# Patient Record
Sex: FEMALE | Race: OTHER | HISPANIC OR LATINO | URBAN - METROPOLITAN AREA
[De-identification: names, ages, dates, MRNs, and addresses within clinical notes are randomized per-mention and may not be internally consistent; named-entity substitution may affect disease eponyms.]

---

## 2022-02-03 ENCOUNTER — HOSPITAL ENCOUNTER (EMERGENCY)
Facility: HOSPITAL | Age: 62
Discharge: HOME | End: 2022-02-03
Attending: EMERGENCY MEDICINE | Admitting: EMERGENCY MEDICINE
Payer: MEDICARE

## 2022-02-03 ENCOUNTER — APPOINTMENT (EMERGENCY)
Dept: ULTRASOUND IMAGING | Facility: HOSPITAL | Age: 62
End: 2022-02-03
Payer: MEDICARE

## 2022-02-03 ENCOUNTER — APPOINTMENT (EMERGENCY)
Dept: CT IMAGING | Facility: HOSPITAL | Age: 62
End: 2022-02-03
Payer: MEDICARE

## 2022-02-03 VITALS
RESPIRATION RATE: 20 BRPM | TEMPERATURE: 98.1 F | HEART RATE: 86 BPM | WEIGHT: 171.3 LBS | SYSTOLIC BLOOD PRESSURE: 172 MMHG | DIASTOLIC BLOOD PRESSURE: 90 MMHG | OXYGEN SATURATION: 100 %

## 2022-02-03 DIAGNOSIS — K80.20 GALLBLADDER COLIC: Primary | ICD-10-CM

## 2022-02-03 DIAGNOSIS — K80.20 GALLSTONES: ICD-10-CM

## 2022-02-03 DIAGNOSIS — K86.1 CHRONIC PANCREATITIS (HCC): ICD-10-CM

## 2022-02-03 LAB
ALBUMIN SERPL BCP-MCNC: 2.9 G/DL (ref 3.5–5)
ALP SERPL-CCNC: 176 U/L (ref 46–116)
ALT SERPL W P-5'-P-CCNC: 61 U/L (ref 12–78)
ANION GAP SERPL CALCULATED.3IONS-SCNC: 9 MMOL/L (ref 4–13)
AST SERPL W P-5'-P-CCNC: 174 U/L (ref 5–45)
ATRIAL RATE: 76 BPM
BASOPHILS # BLD AUTO: 0.02 THOUSANDS/ΜL (ref 0–0.1)
BASOPHILS NFR BLD AUTO: 0 % (ref 0–1)
BILIRUB SERPL-MCNC: 0.8 MG/DL (ref 0.2–1)
BUN SERPL-MCNC: 18 MG/DL (ref 5–25)
CALCIUM ALBUM COR SERPL-MCNC: 9.6 MG/DL (ref 8.3–10.1)
CALCIUM SERPL-MCNC: 8.7 MG/DL (ref 8.3–10.1)
CARDIAC TROPONIN I PNL SERPL HS: 5 NG/L
CHLORIDE SERPL-SCNC: 106 MMOL/L (ref 100–108)
CO2 SERPL-SCNC: 24 MMOL/L (ref 21–32)
CREAT SERPL-MCNC: 1.79 MG/DL (ref 0.6–1.3)
EOSINOPHIL # BLD AUTO: 0.02 THOUSAND/ΜL (ref 0–0.61)
EOSINOPHIL NFR BLD AUTO: 0 % (ref 0–6)
ERYTHROCYTE [DISTWIDTH] IN BLOOD BY AUTOMATED COUNT: 12.6 % (ref 11.6–15.1)
GFR SERPL CREATININE-BSD FRML MDRD: 30 ML/MIN/1.73SQ M
GLUCOSE SERPL-MCNC: 129 MG/DL (ref 65–140)
HCT VFR BLD AUTO: 39.3 % (ref 34.8–46.1)
HGB BLD-MCNC: 13.2 G/DL (ref 11.5–15.4)
IMM GRANULOCYTES # BLD AUTO: 0.01 THOUSAND/UL (ref 0–0.2)
IMM GRANULOCYTES NFR BLD AUTO: 0 % (ref 0–2)
LIPASE SERPL-CCNC: 233 U/L (ref 73–393)
LYMPHOCYTES # BLD AUTO: 1.55 THOUSANDS/ΜL (ref 0.6–4.47)
LYMPHOCYTES NFR BLD AUTO: 33 % (ref 14–44)
MCH RBC QN AUTO: 32.5 PG (ref 26.8–34.3)
MCHC RBC AUTO-ENTMCNC: 33.6 G/DL (ref 31.4–37.4)
MCV RBC AUTO: 97 FL (ref 82–98)
MONOCYTES # BLD AUTO: 0.41 THOUSAND/ΜL (ref 0.17–1.22)
MONOCYTES NFR BLD AUTO: 9 % (ref 4–12)
NEUTROPHILS # BLD AUTO: 2.74 THOUSANDS/ΜL (ref 1.85–7.62)
NEUTS SEG NFR BLD AUTO: 58 % (ref 43–75)
NRBC BLD AUTO-RTO: 0 /100 WBCS
P AXIS: 70 DEGREES
PLATELET # BLD AUTO: 236 THOUSANDS/UL (ref 149–390)
PMV BLD AUTO: 9.2 FL (ref 8.9–12.7)
POTASSIUM SERPL-SCNC: 3.3 MMOL/L (ref 3.5–5.3)
PR INTERVAL: 154 MS
PROT SERPL-MCNC: 7.5 G/DL (ref 6.4–8.2)
QRS AXIS: 63 DEGREES
QRSD INTERVAL: 86 MS
QT INTERVAL: 440 MS
QTC INTERVAL: 495 MS
RBC # BLD AUTO: 4.06 MILLION/UL (ref 3.81–5.12)
SODIUM SERPL-SCNC: 139 MMOL/L (ref 136–145)
T WAVE AXIS: 57 DEGREES
VENTRICULAR RATE: 76 BPM
WBC # BLD AUTO: 4.75 THOUSAND/UL (ref 4.31–10.16)

## 2022-02-03 PROCEDURE — 99285 EMERGENCY DEPT VISIT HI MDM: CPT | Performed by: EMERGENCY MEDICINE

## 2022-02-03 PROCEDURE — 80053 COMPREHEN METABOLIC PANEL: CPT | Performed by: EMERGENCY MEDICINE

## 2022-02-03 PROCEDURE — 76705 ECHO EXAM OF ABDOMEN: CPT

## 2022-02-03 PROCEDURE — 99285 EMERGENCY DEPT VISIT HI MDM: CPT

## 2022-02-03 PROCEDURE — 96375 TX/PRO/DX INJ NEW DRUG ADDON: CPT

## 2022-02-03 PROCEDURE — 36415 COLL VENOUS BLD VENIPUNCTURE: CPT | Performed by: EMERGENCY MEDICINE

## 2022-02-03 PROCEDURE — 85025 COMPLETE CBC W/AUTO DIFF WBC: CPT | Performed by: EMERGENCY MEDICINE

## 2022-02-03 PROCEDURE — 93010 ELECTROCARDIOGRAM REPORT: CPT | Performed by: INTERNAL MEDICINE

## 2022-02-03 PROCEDURE — 84484 ASSAY OF TROPONIN QUANT: CPT | Performed by: EMERGENCY MEDICINE

## 2022-02-03 PROCEDURE — 83690 ASSAY OF LIPASE: CPT | Performed by: EMERGENCY MEDICINE

## 2022-02-03 PROCEDURE — 93005 ELECTROCARDIOGRAM TRACING: CPT

## 2022-02-03 PROCEDURE — 74176 CT ABD & PELVIS W/O CONTRAST: CPT

## 2022-02-03 PROCEDURE — 96374 THER/PROPH/DIAG INJ IV PUSH: CPT

## 2022-02-03 RX ORDER — LINAGLIPTIN 5 MG/1
5 TABLET, FILM COATED ORAL DAILY
COMMUNITY
Start: 2021-10-11 | End: 2022-10-11

## 2022-02-03 RX ORDER — ATORVASTATIN CALCIUM 20 MG/1
20 TABLET, FILM COATED ORAL DAILY
COMMUNITY
Start: 2021-11-18 | End: 2022-11-18

## 2022-02-03 RX ORDER — ONDANSETRON 2 MG/ML
4 INJECTION INTRAMUSCULAR; INTRAVENOUS ONCE
Status: COMPLETED | OUTPATIENT
Start: 2022-02-03 | End: 2022-02-03

## 2022-02-03 RX ORDER — DOLUTEGRAVIR SODIUM 50 MG/1
50 TABLET, FILM COATED ORAL DAILY
COMMUNITY
Start: 2021-10-11

## 2022-02-03 RX ORDER — GLIMEPIRIDE 4 MG/1
1 TABLET ORAL 2 TIMES DAILY
COMMUNITY
Start: 2021-10-11

## 2022-02-03 RX ORDER — MORPHINE SULFATE 4 MG/ML
4 INJECTION, SOLUTION INTRAMUSCULAR; INTRAVENOUS ONCE
Status: COMPLETED | OUTPATIENT
Start: 2022-02-03 | End: 2022-02-03

## 2022-02-03 RX ORDER — KETOROLAC TROMETHAMINE 30 MG/ML
30 INJECTION, SOLUTION INTRAMUSCULAR; INTRAVENOUS ONCE
Status: COMPLETED | OUTPATIENT
Start: 2022-02-03 | End: 2022-02-03

## 2022-02-03 RX ORDER — EMTRICITABINE AND TENOFOVIR ALAFENAMIDE 200; 25 MG/1; MG/1
1 TABLET ORAL DAILY
COMMUNITY
Start: 2021-10-11

## 2022-02-03 RX ADMIN — MORPHINE SULFATE 4 MG: 4 INJECTION INTRAVENOUS at 18:06

## 2022-02-03 RX ADMIN — ONDANSETRON 4 MG: 2 INJECTION INTRAMUSCULAR; INTRAVENOUS at 18:05

## 2022-02-03 RX ADMIN — KETOROLAC TROMETHAMINE 30 MG: 30 INJECTION, SOLUTION INTRAMUSCULAR at 18:05

## 2022-02-03 NOTE — ED PROVIDER NOTES
History  Chief Complaint   Patient presents with    Abdominal Pain     upper abdominal pain and nausea beginning this afternoon  Patient is a 30-year-old female  She has a history of HIV, diabetes, peptic ulcer disease, asthma, GERD and gallstones  She presents to emergency room complaining of abdominal pain  It started this morning  It became worse after eating at [facility]  It is upper abdominal pain  It is right upper quadrant  It radiates to back  She is nauseated but has not vomited  She has had some constipation  No diarrhea  No hematemesis, hematochezia or melena  No fever or chills  No chest pain  No urinary or vaginal complaints  The pain is currently severe without relieving factors  Prior to Admission Medications   Prescriptions Last Dose Informant Patient Reported? Taking?   atorvastatin (LIPITOR) 20 mg tablet   Yes Yes   Sig: Take 20 mg by mouth daily   dolutegravir (Tivicay) 50 MG TABS   Yes Yes   Sig: Take 50 mg by mouth daily   emtricitabine-tenofovir AF (Descovy) 200-25 MG tablet   Yes Yes   Sig: Take 1 tablet by mouth daily   esomeprazole (NexIUM) 20 mg capsule   Yes Yes   Sig: Take 20 mg by mouth   glimepiride (AMARYL) 4 mg tablet   Yes Yes   Sig: Take 1 tablet by mouth 2 (two) times a day   linaGLIPtin (Tradjenta) 5 MG TABS   Yes Yes   Sig: Take 5 mg by mouth daily      Facility-Administered Medications: None       Past Medical History:   Diagnosis Date    Asthma     Diabetes mellitus (HCC)     Gallstones     GERD (gastroesophageal reflux disease)     HIV (human immunodeficiency virus infection) ([facility] [address] )     Stomach ulcer        History reviewed  No pertinent surgical history  History reviewed  No pertinent family history  I have reviewed and agree with the history as documented      E-Cigarette/Vaping     E-Cigarette/Vaping Substances     Social History     Tobacco Use    Smoking status: Current Every Day Smoker     Packs/day: 1 00     Types: Cigarettes    Smokeless tobacco: Never Used   Substance Use Topics    Alcohol use: Never    Drug use: Never       Review of Systems   Constitutional: Negative for chills and fever  HENT: Negative for rhinorrhea and sore throat  Eyes: Negative for pain, redness and visual disturbance  Respiratory: Negative for cough and shortness of breath  Cardiovascular: Negative for chest pain and leg swelling  Gastrointestinal: Positive for abdominal pain, constipation and nausea  Negative for diarrhea and vomiting  Endocrine: Negative for polydipsia and polyuria  Genitourinary: Negative for dysuria, frequency, hematuria, vaginal bleeding and vaginal discharge  Musculoskeletal: Positive for back pain  Negative for neck pain  Skin: Negative for rash and wound  Allergic/Immunologic: Negative for immunocompromised state  Neurological: Negative for weakness, numbness and headaches  Hematological: Does not bruise/bleed easily  Psychiatric/Behavioral: Negative for hallucinations and suicidal ideas  All other systems reviewed and are negative  Physical Exam  Physical Exam  Vitals reviewed  Constitutional:       General: She is in acute distress  Appearance: She is obese  HENT:      Head: Normocephalic and atraumatic  Nose: Nose normal       Mouth/Throat:      Mouth: Mucous membranes are moist    Eyes:      General:         Right eye: No discharge  Left eye: No discharge  Conjunctiva/sclera: Conjunctivae normal    Cardiovascular:      Rate and Rhythm: Normal rate and regular rhythm  Pulses: Normal pulses  Heart sounds: Normal heart sounds  No murmur heard  No friction rub  No gallop  Pulmonary:      Effort: Pulmonary effort is normal  No respiratory distress  Breath sounds: Normal breath sounds  No stridor  No wheezing, rhonchi or rales  Abdominal:      General: Bowel sounds are normal  There is no distension  Palpations: Abdomen is soft  Tenderness:  There is abdominal tenderness in the right upper quadrant and epigastric area  There is no right CVA tenderness, left CVA tenderness, guarding or rebound  Positive signs include Lemon's sign  Musculoskeletal:         General: No swelling, tenderness, deformity or signs of injury  Normal range of motion  Cervical back: Normal range of motion and neck supple  No rigidity  Right lower leg: No edema  Left lower leg: No edema  Comments: No calf tenderness or unilateral leg swelling  Skin:     General: Skin is warm and dry  Coloration: Skin is not jaundiced  Findings: No rash  Neurological:      General: No focal deficit present  Mental Status: She is alert and oriented to person, place, and time  Sensory: No sensory deficit  Motor: Motor function is intact     Psychiatric:         Mood and Affect: Mood normal          Behavior: Behavior normal          Vital Signs  ED Triage Vitals [02/03/22 1703]   Temperature Pulse Respirations Blood Pressure SpO2   98 1 °F (36 7 °C) 75 (!) 24 (!) 194/91 100 %      Temp Source Heart Rate Source Patient Position - Orthostatic VS BP Location FiO2 (%)   Oral Monitor Sitting Right arm --      Pain Score       10 - Worst Possible Pain           Vitals:    02/03/22 1815 02/03/22 1830 02/03/22 1845 02/03/22 1915   BP: (!) 189/94 (!) 180/90 157/82 (!) 172/90   Pulse: 76 74 74 86   Patient Position - Orthostatic VS: Lying Lying Lying Lying         Visual Acuity      ED Medications  Medications   ondansetron (ZOFRAN) injection 4 mg (4 mg Intravenous Given 2/3/22 1805)   morphine (PF) 4 mg/mL injection 4 mg (4 mg Intravenous Given 2/3/22 1806)   ketorolac (TORADOL) injection 30 mg (30 mg Intravenous Given 2/3/22 1805)       Diagnostic Studies  Results Reviewed     Procedure Component Value Units Date/Time    HS Troponin I 2hr [[id number]]     Lab Status: No result Specimen: Blood     HS Troponin 0hr (reflex protocol) [[id number]]  (Normal) Collected: 02/03/22 1806    Lab Status: Final result Specimen: Blood from Arm, Right Updated: 02/03/22 1835     hs TnI 0hr 5 ng/L     Comprehensive metabolic panel [[id number]]  (Abnormal) Collected: 02/03/22 1806    Lab Status: Final result Specimen: Blood from Arm, Right Updated: 02/03/22 1828     Sodium 139 mmol/L      Potassium 3 3 mmol/L      Chloride 106 mmol/L      CO2 24 mmol/L      ANION GAP 9 mmol/L      BUN 18 mg/dL      Creatinine 1 79 mg/dL      Glucose 129 mg/dL      Calcium 8 7 mg/dL      Corrected Calcium 9 6 mg/dL       U/L      ALT 61 U/L      Alkaline Phosphatase 176 U/L      Total Protein 7 5 g/dL      Albumin 2 9 g/dL      Total Bilirubin 0 80 mg/dL      eGFR 30 ml/min/1 73sq m     Narrative:      National Kidney Disease Foundation guidelines for Chronic Kidney Disease (CKD):     Stage 1 with normal or high GFR (GFR > 90 mL/min/1 73 square meters)    Stage 2 Mild CKD (GFR = 60-89 mL/min/1 73 square meters)    Stage 3A Moderate CKD (GFR = 45-59 mL/min/1 73 square meters)    Stage 3B Moderate CKD (GFR = 30-44 mL/min/1 73 square meters)    Stage 4 Severe CKD (GFR = 15-29 mL/min/1 73 square meters)    Stage 5 End Stage CKD (GFR <15 mL/min/1 73 square meters)  Note: GFR calculation is accurate only with a steady state creatinine    Lipase [[id number]]  (Normal) Collected: 02/03/22 1806    Lab Status: Final result Specimen: Blood from Arm, Right Updated: 02/03/22 1828     Lipase 233 u/L     CBC and differential [[id number]] Collected: 02/03/22 1806    Lab Status: Final result Specimen: Blood from Arm, Right Updated: 02/03/22 1812     WBC 4 75 Thousand/uL      RBC 4 06 Million/uL      Hemoglobin 13 2 g/dL      Hematocrit 39 3 %      MCV 97 fL      MCH 32 5 pg      MCHC 33 6 g/dL      RDW 12 6 %      MPV 9 2 fL      Platelets 443 Thousands/uL      nRBC 0 /100 WBCs      Neutrophils Relative 58 %      Immat GRANS % 0 %      Lymphocytes Relative 33 %      Monocytes Relative 9 %      Eosinophils Relative 0 % Basophils Relative 0 %      Neutrophils Absolute 2 74 Thousands/µL      Immature Grans Absolute 0 01 Thousand/uL      Lymphocytes Absolute 1 55 Thousands/µL      Monocytes Absolute 0 41 Thousand/µL      Eosinophils Absolute 0 02 Thousand/µL      Basophils Absolute 0 02 Thousands/µL     UA w Reflex to Microscopic w Reflex to Culture [[id number]]     Lab Status: No result Specimen: Urine                  US right upper quadrant   Final Result by Albaro Fields MD (02/03 1943)      Cholelithiasis without sonographic evidence for acute cholecystitis  Workstation performed: [id number]         CT abdomen pelvis wo contrast   Final Result by Tushar Collado MD (02/03 1818)      No acute inflammatory changes in the abdomen or pelvis  Advanced pancreatic parenchymal calcifications in keeping with chronic pancreatitis  Workstation performed: [id number]                    Procedures  ECG 12 Lead Documentation Only    Date/Time: 2/3/2022 5:40 PM  Performed by: Adri Ferguson MD  Authorized by: Adri Ferguson MD     ECG reviewed by me, the ED Provider: yes    Patient location:  ED  Interpretation:     Interpretation: abnormal    Comments:      Normal sinus rhythm  Prolonged QT  No acute ischemic ST or T-wave changes  ED Course                               SBIRT 20yo+      Most Recent Value   SBIRT (24 yo +)    In order to provide better care to our patients, we are screening all of our patients for alcohol and drug use  Would it be okay to ask you these screening questions? No Filed at: 02/03/2022 1714                    MDM  Number of Diagnoses or Management Options  Diagnosis management comments: Patient eloped prior to completion of ED workup  CT of the abdomen pelvis suggested chronic pancreatitis  Ultrasound did show gallstones without acute cholecystitis  There were no significant at elevations in her LFTs or lipase  White blood cell count was okay    There is no evidence of coronary ischemia  There was no AAA  Amount and/or Complexity of Data Reviewed  Clinical lab tests: ordered and reviewed  Tests in the radiology section of CPT®: ordered and reviewed  Independent visualization of images, tracings, or specimens: yes        Disposition  Final diagnoses:   Gallbladder colic   Gallstones   Chronic pancreatitis ([facility] [address] )     Time reflects when diagnosis was documented in both MDM as applicable and the Disposition within this note     Time User Action Codes Description Comment    2/3/2022  8:00 PM Artice Pace Add [B75 28] Gallbladder colic     9/6/5175  0:22 PM Artice Pace Add [K80 20] Gallstones     2/3/2022  8:00 PM Artice Pace Add [K86 1] Chronic pancreatitis [facility])       ED Disposition     ED Disposition Condition Date/Time Comment    Left from Room after Provider Exam  Thu Feb 3, 2022  8:00 PM       Follow-up Information    None         Patient's Medications   Discharge Prescriptions    No medications on file       No discharge procedures on file      PDMP Review     None          ED Provider  Electronically Signed by           Dean Hassan MD  02/03/22 2000

## 2022-02-04 NOTE — ED NOTES
RN went in room and patient had ripped out IV  Patient states, "I'm leaving, I don't feel like waiting for the doctor or for paperwork or anything like that " Patient left ED at this time  Provider made aware        Adrianna Roth RN  02/03/22 7810

## 2022-11-23 ENCOUNTER — PATIENT OUTREACH (OUTPATIENT)
Dept: SURGERY | Facility: CLINIC | Age: 62
End: 2022-11-23

## 2022-11-23 NOTE — PROGRESS NOTES
HC called Ct and scheduled housing assessment for 11/30/2022 at [address] [place] [address] OF [facility]  texted Ct everything needed for appt

## 2022-12-01 ENCOUNTER — PATIENT OUTREACH (OUTPATIENT)
Dept: SURGERY | Facility: CLINIC | Age: 62
End: 2022-12-01

## 2022-12-06 ENCOUNTER — PATIENT OUTREACH (OUTPATIENT)
Dept: SURGERY | Facility: CLINIC | Age: 62
End: 2022-12-06

## 2022-12-07 NOTE — PROGRESS NOTES
CT came in for scheduled CM intake appt but did not bring any required paperwork   CM and Ct will schedule appt when Ct has all required paperwork

## 2022-12-08 NOTE — PROGRESS NOTES
Ct brought in paperwork for [facility]  to look over as CT is interested in housing assistance  HC was in training all day and will contact Ct to set up intake and housing assessment when documents are reviewed

## 2022-12-12 ENCOUNTER — PATIENT OUTREACH (OUTPATIENT)
Dept: SURGERY | Facility: CLINIC | Age: 62
End: 2022-12-12

## 2022-12-12 NOTE — PROGRESS NOTES
Ct came in to see [facility]  without an appt  HC let Ct know that her information will be reviewed by [facility]  supervisor and if eligible HC and Ct will complete application over the phone  Ct asked if it will be done before she goes to court and [facility]  stated that she was unsure   HC and HC sup will be meeting on Wednesday

## 2022-12-14 ENCOUNTER — PATIENT OUTREACH (OUTPATIENT)
Dept: SURGERY | Facility: CLINIC | Age: 62
End: 2022-12-14

## 2022-12-15 NOTE — PROGRESS NOTES
HC and HC sup went over paperwork submitted by Ct  Ct needs to provide accurate lease with all pages included, a copy of most recent food stamp documents showing how much she receives monthly, and information about why she is not receiving 22 10 from [facility]  Once that is brought in and reviewed housing assessment can be completed and process can move forward  Ct was called and told all of this over the phone   Ct stated she understood

## 2023-01-23 ENCOUNTER — PATIENT OUTREACH (OUTPATIENT)
Dept: SURGERY | Facility: CLINIC | Age: 63
End: 2023-01-23

## 2023-01-26 NOTE — PROGRESS NOTES
HC spoke with Miriam Rosen from [facility] regarding CT and her need for housing  Explained to Miriam Rosen what Ct did when she came to [facility] for assistance  Miriam Rosen requested a list of documents needed in order for Ct to receive help  [facility]  sent list to Miriam Rosen via email   Once everything is collected, [facility]  will schedule appt with Ct for intake and housing assessment

## 2023-01-30 ENCOUNTER — PATIENT OUTREACH (OUTPATIENT)
Dept: SURGERY | Facility: CLINIC | Age: 63
End: 2023-01-30

## 2023-02-02 ENCOUNTER — PATIENT OUTREACH (OUTPATIENT)
Dept: SURGERY | Facility: CLINIC | Age: 63
End: 2023-02-02

## 2023-02-06 ENCOUNTER — PATIENT OUTREACH (OUTPATIENT)
Dept: SURGERY | Facility: CLINIC | Age: 63
End: 2023-02-06

## 2023-02-06 NOTE — PROGRESS NOTES
Ct came in for intake appt with [facility]  HIV flowsheet was completed along with recertification tabs on Epic  Ct presented with paperwork needed  Ct is seen by [facility] for all care PCP and ID  CT also has a  there  Ct denies drug and alcohol use but admits mabel having MH issues  CT admits to "sometimes" taking medications  Ct and HC also completed housing assessment as Ct is requesting assistance with her rent  Ct needs to provide girlfriends bank statements and bills in order to complete assessment  SCP goals were completed

## 2023-02-10 ENCOUNTER — PATIENT OUTREACH (OUTPATIENT)
Dept: SURGERY | Facility: CLINIC | Age: 63
End: 2023-02-10

## 2023-02-10 NOTE — PROGRESS NOTES
Ct phone is not in service   [facility] OF [place], [facility]  emailed CT letting her know that paystubs sent are blurry and need to be printed and brought into the office

## 2023-02-14 ENCOUNTER — PATIENT OUTREACH (OUTPATIENT)
Dept: SURGERY | Facility: CLINIC | Age: 63
End: 2023-02-14

## 2023-02-14 NOTE — PROGRESS NOTES
Ct called about status of her housing assessment  HC told Ct told Ct that an email was sent to her in regards to needing her girlfriends pay stubs as they were not clear in the email sent to [facility]  HC also needs whatever form of banking her girlfriend uses to receive pay stubs  Ct says she will have that faxed to [facility]   Housing assessment cannot be completed or reviewed until that information is received

## 2023-02-15 ENCOUNTER — PATIENT OUTREACH (OUTPATIENT)
Dept: SURGERY | Facility: CLINIC | Age: 63
End: 2023-02-15

## 2023-02-15 NOTE — PROGRESS NOTES
Ct called again in regards to paperwork sent to [facility]  HC told CT that the forms are not clear and Ct needs to print them out and bring them to [facility]  HC also reminded Ct that statements from girlfriends account is needed as well

## 2023-02-22 ENCOUNTER — PATIENT OUTREACH (OUTPATIENT)
Dept: SURGERY | Facility: CLINIC | Age: 63
End: 2023-02-22

## 2023-02-24 ENCOUNTER — PATIENT OUTREACH (OUTPATIENT)
Dept: SURGERY | Facility: CLINIC | Age: 63
End: 2023-02-24

## 2023-02-27 ENCOUNTER — PATIENT OUTREACH (OUTPATIENT)
Dept: SURGERY | Facility: CLINIC | Age: 63
End: 2023-02-27

## 2023-02-27 NOTE — PROGRESS NOTES
Spoke with Ct about paperwork needed  States she will try and have them for [facility] [place], [facility]  by end of week   Discussed HA with HS

## 2023-03-01 NOTE — PROGRESS NOTES
Ct called HC wanting to schedule appt t come in and provide necessary paperwork   HC will meet Ct on Monday at 10am

## 2023-03-01 NOTE — PROGRESS NOTES
Ct came in for scheduled appt with HC  HC and CT completed another housing assessment and were able to print all necessary paperwork needed to complete assessment  HC will go over and send to HS for review

## 2023-03-07 ENCOUNTER — PATIENT OUTREACH (OUTPATIENT)
Dept: SURGERY | Facility: CLINIC | Age: 63
End: 2023-03-07

## 2023-03-07 NOTE — PROGRESS NOTES
Ct called wanting to know if [facility]  received email  HC confirmed receipt  HC needs zero income form to be filled out by CT girlfriend   Ct will meet [facility]  tomorrow at 930am

## 2023-03-08 ENCOUNTER — PATIENT OUTREACH (OUTPATIENT)
Dept: SURGERY | Facility: CLINIC | Age: 63
End: 2023-03-08

## 2023-03-08 NOTE — PROGRESS NOTES
Ct came in with girlfriend to sign zero income paperwork for housing assessment  Ct needs to provide up to date award letter, food stamp paperwork and child support order/payments   Landlord was called and letter of amount of back rent will be sent to [facility] OF [facility]  along with w9 and residence to rent form

## 2023-03-09 ENCOUNTER — PATIENT OUTREACH (OUTPATIENT)
Dept: SURGERY | Facility: CLINIC | Age: 63
End: 2023-03-09

## 2023-03-09 NOTE — PROGRESS NOTES
Ct came in today to provide necessary documents needed in order to complete housing assessment   HC and HS will review on Monday and HC will [facility]

## 2023-03-13 ENCOUNTER — PATIENT OUTREACH (OUTPATIENT)
Dept: SURGERY | Facility: CLINIC | Age: 63
End: 2023-03-13

## 2023-03-14 ENCOUNTER — PATIENT OUTREACH (OUTPATIENT)
Dept: SURGERY | Facility: CLINIC | Age: 63
End: 2023-03-14

## 2023-03-14 NOTE — PROGRESS NOTES
Ct came in and provided necessary documents  Calculations were completed and checked   Sent to HS for approval

## 2023-03-14 NOTE — PROGRESS NOTES
Ct called asking for an update on housing assessment  HC let Ct know that calculations will be completed today and HC will let her know outcome  HC and HS completed calculations together and discussed Ct need  HS wants more information about rent not paid and updated statement from girlfriend   Ct will provide statement tomorrow

## 2023-03-15 ENCOUNTER — PATIENT OUTREACH (OUTPATIENT)
Dept: SURGERY | Facility: CLINIC | Age: 63
End: 2023-03-15

## 2023-03-15 NOTE — PROGRESS NOTES
Ct was discussed with HS  HC will need to complete inspection for apartment before Breana Escobar is approved  HC called and spoke with CT   Home inspection will be done on Friday March 17th at [place] [id number] for check for back rent was completed and sent to [facility] for approval

## 2023-03-17 ENCOUNTER — PATIENT OUTREACH (OUTPATIENT)
Dept: SURGERY | Facility: CLINIC | Age: 63
End: 2023-03-17

## 2023-03-20 ENCOUNTER — PATIENT OUTREACH (OUTPATIENT)
Dept: SURGERY | Facility: CLINIC | Age: 63
End: 2023-03-20

## 2023-03-20 NOTE — PROGRESS NOTES
Ct called stating that oniel has not received check for back rent  [facility]  emailed Aroldo Sanchez from accounts payable to find out info and is waiting on reply  Oniel emailed [facility]  also and the same response was given about waiting on accounts payable  req for check is completed but will not be sent until oniel installs smoke detectors in the home   Oniel was emailed about this

## 2023-03-20 NOTE — PROGRESS NOTES
Inspection completed  Auth was discussed and approved by HS so long as smoke detectors are placed in the home     HC will reach out to ezra to discuss

## 2023-03-21 ENCOUNTER — PATIENT OUTREACH (OUTPATIENT)
Dept: SURGERY | Facility: CLINIC | Age: 63
End: 2023-03-21

## 2023-03-21 NOTE — PROGRESS NOTES
HC returned ct call  HC received email from oniel asking about the April rent and how things will go moving forward  HC requested that oniel call [facility]  so that they can talk over the phone  Oniel will contact [facility]  tomorrow when he has off  [employer]one[employer] will also install smoke detectors and railing so that Aprils rent can be sent to accounts payable for processing  HC called Ct to let her know about rental amount and landlord coming tomorrow to install smoke detectors and railing   Ct will call [facility]  tomorrow when oniel is there

## 2023-03-22 ENCOUNTER — PATIENT OUTREACH (OUTPATIENT)
Dept: SURGERY | Facility: CLINIC | Age: 63
End: 2023-03-22

## 2023-03-23 ENCOUNTER — PATIENT OUTREACH (OUTPATIENT)
Dept: SURGERY | Facility: CLINIC | Age: 63
End: 2023-03-23

## 2023-03-23 NOTE — PROGRESS NOTES
CT and ezra contact Man[employer] letting her know that smoke detectors and railing will be installed today  Pictures will be sent to Man[employer] via email by ezra   HC reminded ezra that rent will be processed once pictures are received

## 2023-03-24 NOTE — PROGRESS NOTES
HC received emailed pictures of smoke detectors and railing from [employer]   HC sent req for check for [facility] OF [place], [facility]  spoke with Ct and reminded her of her portion that [facility]

## 2023-03-31 ENCOUNTER — PATIENT OUTREACH (OUTPATIENT)
Dept: SURGERY | Facility: CLINIC | Age: 63
End: 2023-03-31

## 2023-04-12 ENCOUNTER — PATIENT OUTREACH (OUTPATIENT)
Dept: SURGERY | Facility: CLINIC | Age: 63
End: 2023-04-12

## 2023-05-01 ENCOUNTER — PATIENT OUTREACH (OUTPATIENT)
Dept: SURGERY | Facility: CLINIC | Age: 63
End: 2023-05-01

## 2023-05-02 ENCOUNTER — PATIENT OUTREACH (OUTPATIENT)
Dept: SURGERY | Facility: CLINIC | Age: 63
End: 2023-05-02

## 2023-06-01 ENCOUNTER — PATIENT OUTREACH (OUTPATIENT)
Dept: SURGERY | Facility: CLINIC | Age: 63
End: 2023-06-01

## 2023-06-01 NOTE — PROGRESS NOTES
Ct called wanting to f/u with HC   Ct shared that she is receiving cash assistance and will bring all documents when [facility] OF [place], [facility]  and Ct meet next Tuesday at the [facility] office at [place] [address]

## 2023-06-05 ENCOUNTER — PATIENT OUTREACH (OUTPATIENT)
Dept: SURGERY | Facility: CLINIC | Age: 63
End: 2023-06-05

## 2023-06-06 ENCOUNTER — PATIENT OUTREACH (OUTPATIENT)
Dept: SURGERY | Facility: CLINIC | Age: 63
End: 2023-06-06

## 2023-06-06 NOTE — PROGRESS NOTES
HC and Ct spoke over the phone, Ct lost her phone and called from her girlfriends phone  Ct wants to reschedule appt because she needs to go to child support and retrieve paperwork stating that she is not receiving support at this time  HC and Ct will meet in the [facility] office on Wednesday at 10am

## 2023-06-07 ENCOUNTER — PATIENT OUTREACH (OUTPATIENT)
Dept: SURGERY | Facility: CLINIC | Age: 63
End: 2023-06-07

## 2023-06-07 NOTE — PROGRESS NOTES
Ct and HC spoke about meeting tomorrow so that paperwork can be submitted   Ct was also discussed with Cristhian Mcneill in regards to inspection that needs to be scheduled for next week

## 2023-06-09 ENCOUNTER — PATIENT OUTREACH (OUTPATIENT)
Dept: SURGERY | Facility: CLINIC | Age: 63
End: 2023-06-09

## 2023-06-14 ENCOUNTER — PATIENT OUTREACH (OUTPATIENT)
Dept: SURGERY | Facility: CLINIC | Age: 63
End: 2023-06-14

## 2023-06-15 NOTE — PROGRESS NOTES
Ct called HC about inspection  HC let Ct know that she will call her tomorrow to schedule inspection for Thursday or Monday  Ct agreed  HC discussed with HS plans

## 2023-06-21 ENCOUNTER — PATIENT OUTREACH (OUTPATIENT)
Dept: SURGERY | Facility: CLINIC | Age: 63
End: 2023-06-21

## 2023-06-26 ENCOUNTER — PATIENT OUTREACH (OUTPATIENT)
Dept: SURGERY | Facility: CLINIC | Age: 63
End: 2023-06-26

## 2023-06-27 NOTE — PROGRESS NOTES
Discussed inspection of home with Carol Mills from [place] over the phone  As long as the 3 bedroom unit meets the housing payment standard for a 2 bedroom, [employer] can continue to pay  HS and HC will go over new calculations this week as Ct provided TANF documents   Ct is not receiving child support and is now receiving cash from [address] [place], [place] [id number] [place]

## 2023-06-29 ENCOUNTER — PATIENT OUTREACH (OUTPATIENT)
Dept: SURGERY | Facility: CLINIC | Age: 63
End: 2023-06-29

## 2023-06-30 ENCOUNTER — PATIENT OUTREACH (OUTPATIENT)
Dept: SURGERY | Facility: CLINIC | Age: 63
End: 2023-06-30

## 2023-07-07 ENCOUNTER — PATIENT OUTREACH (OUTPATIENT)
Dept: SURGERY | Facility: CLINIC | Age: 63
End: 2023-07-07

## 2023-07-13 ENCOUNTER — PATIENT OUTREACH (OUTPATIENT)
Dept: SURGERY | Facility: CLINIC | Age: 63
End: 2023-07-13

## 2023-07-14 NOTE — PROGRESS NOTES
Ct called stating she wants to meet and talk with [facility] OF [place], [facility]..  Ct states she will call [facility]. with date and time later

## 2023-07-14 NOTE — PROGRESS NOTES
Ct called Cm wanting to schedule appt. Ct states she needs to speak with CM about something.  Ct and CM will meet on Monday at 1pm

## 2023-07-17 ENCOUNTER — PATIENT OUTREACH (OUTPATIENT)
Dept: SURGERY | Facility: CLINIC | Age: 63
End: 2023-07-17

## 2023-07-17 NOTE — PROGRESS NOTES
req for check heather August completed and sent to [facility] for approval

## 2023-07-27 ENCOUNTER — PATIENT OUTREACH (OUTPATIENT)
Dept: SURGERY | Facility: CLINIC | Age: 63
End: 2023-07-27

## 2023-07-28 ENCOUNTER — PATIENT OUTREACH (OUTPATIENT)
Dept: SURGERY | Facility: CLINIC | Age: 63
End: 2023-07-28

## 2023-07-28 NOTE — PROGRESS NOTES
Spoke with ct over the phone. F/u on how things are going for her in her personal life as well as her home. Ct states she has been paying her portion of the rent and landlord continues to help with fixing things around the house that need to be fixed.  Ct and HC will talk again in August to complete Epic assessment

## 2023-08-23 ENCOUNTER — PATIENT OUTREACH (OUTPATIENT)
Dept: SURGERY | Facility: CLINIC | Age: 63
End: 2023-08-23

## 2023-08-24 NOTE — PROGRESS NOTES
req for check completed and sent to [facility] OF [place], [facility]. sup for review

## 2023-08-29 ENCOUNTER — PATIENT OUTREACH (OUTPATIENT)
Dept: SURGERY | Facility: CLINIC | Age: 63
End: 2023-08-29

## 2023-08-30 NOTE — PROGRESS NOTES
Worked on ct calculations for October rent, sent to [facility] OF [place], [facility]. Sup for review

## 2023-09-11 ENCOUNTER — PATIENT OUTREACH (OUTPATIENT)
Dept: SURGERY | Facility: CLINIC | Age: 63
End: 2023-09-11

## 2023-09-14 ENCOUNTER — PATIENT OUTREACH (OUTPATIENT)
Dept: SURGERY | Facility: CLINIC | Age: 63
End: 2023-09-14

## 2023-09-15 ENCOUNTER — PATIENT OUTREACH (OUTPATIENT)
Dept: SURGERY | Facility: CLINIC | Age: 63
End: 2023-09-15

## 2023-09-16 NOTE — PROGRESS NOTES
Req for check for rent completed and sent to [facility] OF [place], [facility]. sup for approval

## 2023-09-16 NOTE — PROGRESS NOTES
Contacted [facility] and ct to let them know of new ct rental amount for rent.

## 2023-10-02 ENCOUNTER — PATIENT OUTREACH (OUTPATIENT)
Dept: SURGERY | Facility: CLINIC | Age: 63
End: 2023-10-02

## 2023-10-06 ENCOUNTER — PATIENT OUTREACH (OUTPATIENT)
Dept: SURGERY | Facility: CLINIC | Age: 63
End: 2023-10-06

## 2023-10-12 ENCOUNTER — PATIENT OUTREACH (OUTPATIENT)
Dept: SURGERY | Facility: CLINIC | Age: 63
End: 2023-10-12

## 2023-10-16 NOTE — PROGRESS NOTES
Req for check rent completed and sent to [facility] OF [place], [facility]. sup for approval

## 2023-10-20 ENCOUNTER — PATIENT OUTREACH (OUTPATIENT)
Dept: SURGERY | Facility: CLINIC | Age: 63
End: 2023-10-20

## 2023-10-20 NOTE — PROGRESS NOTES
Called and spoke with Ct over the phone. Attempted to apply for [facility] but Ct already has application in with them. Ct states everything is going well and hopes to see [facility] OF [facility] soon.

## 2023-11-17 ENCOUNTER — PATIENT OUTREACH (OUTPATIENT)
Dept: SURGERY | Facility: CLINIC | Age: 63
End: 2023-11-17

## 2023-12-14 ENCOUNTER — PATIENT OUTREACH (OUTPATIENT)
Dept: SURGERY | Facility: CLINIC | Age: 63
End: 2023-12-14

## 2023-12-14 NOTE — PROGRESS NOTES
Req for check rent completed and sent to [facility] OF [place], [facility]. sup

## 2024-01-12 ENCOUNTER — PATIENT OUTREACH (OUTPATIENT)
Dept: SURGERY | Facility: CLINIC | Age: 64
End: 2024-01-12

## 2024-01-30 ENCOUNTER — PATIENT OUTREACH (OUTPATIENT)
Dept: SURGERY | Facility: CLINIC | Age: 64
End: 2024-01-30

## 2024-03-04 ENCOUNTER — PATIENT OUTREACH (OUTPATIENT)
Dept: SURGERY | Facility: CLINIC | Age: 64
End: 2024-03-04

## 2024-03-18 ENCOUNTER — PATIENT OUTREACH (OUTPATIENT)
Dept: SURGERY | Facility: CLINIC | Age: 64
End: 2024-03-18

## 2024-03-19 ENCOUNTER — PATIENT OUTREACH (OUTPATIENT)
Dept: SURGERY | Facility: CLINIC | Age: 64
End: 2024-03-19

## 2024-03-19 NOTE — PROGRESS NOTES
Discuss ct with HC sup. Rent will increase for April. HC requested something in writing from [facility].

## 2024-03-22 ENCOUNTER — PATIENT OUTREACH (OUTPATIENT)
Dept: SURGERY | Facility: CLINIC | Age: 64
End: 2024-03-22

## 2024-03-25 NOTE — PROGRESS NOTES
HC and [facility] were emailing back and forth in regards to updated lease but [facility] is not sending the lease in its entirety. HC will reach out to [facility] Monday by phone

## 2024-03-27 ENCOUNTER — PATIENT OUTREACH (OUTPATIENT)
Dept: SURGERY | Facility: CLINIC | Age: 64
End: 2024-03-27

## 2024-03-27 NOTE — PROGRESS NOTES
Spoke with Ct over the phone about the required paperwork needed to complete housing renewal. Ct states she should have everything ready by thursday

## 2024-03-28 ENCOUNTER — PATIENT OUTREACH (OUTPATIENT)
Dept: SURGERY | Facility: CLINIC | Age: 64
End: 2024-03-28

## 2024-03-28 NOTE — PROGRESS NOTES
Ct and CM completed recert. Ct is seen by [facility] for ID and PCP care. Ct does receive housing services with [place]. Ct takes meds daily. Ct states in the last 3 months she has had 2 sexual partners. Ct states she does not use drugs or alcohol. Ct states she is safe in the her home. Ct is not experiencing domestic violence. Ct adheres to housing stipulations and remains in contact with

## 2024-04-04 ENCOUNTER — PATIENT OUTREACH (OUTPATIENT)
Dept: SURGERY | Facility: CLINIC | Age: 64
End: 2024-04-04

## 2024-04-08 ENCOUNTER — PATIENT OUTREACH (OUTPATIENT)
Dept: SURGERY | Facility: CLINIC | Age: 64
End: 2024-04-08

## 2024-04-09 NOTE — PROGRESS NOTES
Called and apoke with Ct. Ct and HC scheduled housing renewal for Thursday at the [facility]

## 2024-04-11 ENCOUNTER — PATIENT OUTREACH (OUTPATIENT)
Dept: SURGERY | Facility: CLINIC | Age: 64
End: 2024-04-11

## 2024-04-12 ENCOUNTER — PATIENT OUTREACH (OUTPATIENT)
Dept: SURGERY | Facility: CLINIC | Age: 64
End: 2024-04-12

## 2024-04-15 ENCOUNTER — PATIENT OUTREACH (OUTPATIENT)
Dept: SURGERY | Facility: CLINIC | Age: 64
End: 2024-04-15

## 2024-04-16 ENCOUNTER — PATIENT OUTREACH (OUTPATIENT)
Dept: SURGERY | Facility: CLINIC | Age: 64
End: 2024-04-16

## 2024-04-19 ENCOUNTER — PATIENT OUTREACH (OUTPATIENT)
Dept: SURGERY | Facility: CLINIC | Age: 64
End: 2024-04-19

## 2024-04-23 ENCOUNTER — PATIENT OUTREACH (OUTPATIENT)
Dept: SURGERY | Facility: CLINIC | Age: 64
End: 2024-04-23

## 2024-04-24 NOTE — PROGRESS NOTES
Started working on Ct housing calculations. [place] [facility] stated 3 residents. Called Ct for girlfriends information as she must be living with ct and ct did not disclose. HC received 3 months worth of bank statements and award letter from social security. HC will work on calculations and send lexi to  sup

## 2024-04-25 ENCOUNTER — PATIENT OUTREACH (OUTPATIENT)
Dept: SURGERY | Facility: CLINIC | Age: 64
End: 2024-04-25

## 2024-04-25 NOTE — PROGRESS NOTES
HELLEN LYNCH ASSISTED IN COMPLETING INITIAL TBRA CALCULATIONS FOR CT FOR HOUSING RENEWAL WITH HC IRIS.     CT'S LEASE DESCRIBES 3 PEOPLE DWELLING IN UNIT; 2 ADULTS AND 1 CHILD. HC SPOKE WITH HC IRIS REGARDING THE NEED FOR CT'S SIGNIFICANT OTHER PROVIDING PROOF OF INCOME IN ORDER TO CALCULATE ACCURATE RENTAL PAYMENT FOR CT.    HC IRIS TO FOLLOW UP WITH CT REGARDING THE NEEDED ADDITIONAL SUPPORTING DOCUMENTS.

## 2024-05-01 ENCOUNTER — PATIENT OUTREACH (OUTPATIENT)
Dept: SURGERY | Facility: CLINIC | Age: 64
End: 2024-05-01

## 2024-05-01 NOTE — PROGRESS NOTES
Completed calculations with  sup. Called Ct to let her know what the new calculations was starting in June $665.86

## 2024-05-02 ENCOUNTER — PATIENT OUTREACH (OUTPATIENT)
Dept: SURGERY | Facility: CLINIC | Age: 64
End: 2024-05-02

## 2024-05-14 ENCOUNTER — PATIENT OUTREACH (OUTPATIENT)
Dept: SURGERY | Facility: CLINIC | Age: 64
End: 2024-05-14

## 2024-06-18 ENCOUNTER — PATIENT OUTREACH (OUTPATIENT)
Dept: SURGERY | Facility: CLINIC | Age: 64
End: 2024-06-18

## 2024-07-10 ENCOUNTER — PATIENT OUTREACH (OUTPATIENT)
Dept: SURGERY | Facility: CLINIC | Age: 64
End: 2024-07-10

## 2024-07-22 ENCOUNTER — PATIENT OUTREACH (OUTPATIENT)
Dept: SURGERY | Facility: CLINIC | Age: 64
End: 2024-07-22

## 2024-07-25 ENCOUNTER — PATIENT OUTREACH (OUTPATIENT)
Dept: SURGERY | Facility: CLINIC | Age: 64
End: 2024-07-25

## 2024-07-26 NOTE — PROGRESS NOTES
F/u with ct in person. Had ct sign documents that signature was needed. Ct states she is doing well and does not want to move from her current location anytime soon. Ct shared that landlord did come to fix some things in the bathroom that were leaking. Overall Ct is doing well in her living situation and is grateful for assistance

## 2024-08-08 ENCOUNTER — PATIENT OUTREACH (OUTPATIENT)
Dept: SURGERY | Facility: CLINIC | Age: 64
End: 2024-08-08

## 2024-08-15 ENCOUNTER — PATIENT OUTREACH (OUTPATIENT)
Dept: SURGERY | Facility: CLINIC | Age: 64
End: 2024-08-15

## 2024-08-15 NOTE — PROGRESS NOTES
Ct called stating that her girlfriend was moving out and wanted to know what needed to be done. HC explained that the landlord needed to be notified and the lease needed to be modified and sent to HC. Once received ct and HC will meet to complete new calculations

## 2024-08-26 ENCOUNTER — PATIENT OUTREACH (OUTPATIENT)
Dept: SURGERY | Facility: CLINIC | Age: 64
End: 2024-08-26

## 2024-08-27 ENCOUNTER — PATIENT OUTREACH (OUTPATIENT)
Dept: SURGERY | Facility: CLINIC | Age: 64
End: 2024-08-27

## 2024-08-28 ENCOUNTER — PATIENT OUTREACH (OUTPATIENT)
Dept: SURGERY | Facility: CLINIC | Age: 64
End: 2024-08-28

## 2024-08-29 ENCOUNTER — PATIENT OUTREACH (OUTPATIENT)
Dept: SURGERY | Facility: CLINIC | Age: 64
End: 2024-08-29

## 2024-08-31 NOTE — PROGRESS NOTES
Ct called  stating that her girlfriend is no longer living at the house and she wants to see if she can adjust her monthly rental portion. Ct states she is unable to afford it with her not living there. HC let Ct know that she needed proof to verify that girlfriend indeed no longer lives at the address. Ct will contact HC when she is has information

## 2024-09-01 NOTE — PROGRESS NOTES
Ct provided girlfriend landlord phone number. HC called and spoke with ezra to verify her residence at new location

## 2024-09-01 NOTE — PROGRESS NOTES
Ct called to know what the status was with the letter. Ct was told HC needed to discuss further with HC sup and get back to her

## 2024-09-01 NOTE — PROGRESS NOTES
Spoke with and explained  to ct and girlfriend that I need lease information of girlfriends new place and I also need all paperwork for a renewal

## 2024-09-03 ENCOUNTER — PATIENT OUTREACH (OUTPATIENT)
Dept: SURGERY | Facility: CLINIC | Age: 64
End: 2024-09-03

## 2024-09-09 NOTE — PROGRESS NOTES
Ct called waning to clem[place]o when her rent will be changed. I reminded ct that it is a process and she needs to meet with myself to complete a renewal due to change in household. HC will reach out next week to schedule

## 2024-09-16 ENCOUNTER — PATIENT OUTREACH (OUTPATIENT)
Dept: SURGERY | Facility: CLINIC | Age: 64
End: 2024-09-16

## 2024-09-23 NOTE — PROGRESS NOTES
Ivette for check rent October  Spoke with ct over the phone. HC reminded ct that certain paperwork is needed. Ct has not turned anything so far. HC reminded Ct to contact HC when she has everything ready to set up an appt

## 2024-09-24 ENCOUNTER — PATIENT OUTREACH (OUTPATIENT)
Dept: SURGERY | Facility: CLINIC | Age: 64
End: 2024-09-24

## 2024-10-01 ENCOUNTER — PATIENT OUTREACH (OUTPATIENT)
Dept: SURGERY | Facility: CLINIC | Age: 64
End: 2024-10-01

## 2024-10-09 ENCOUNTER — PATIENT OUTREACH (OUTPATIENT)
Dept: SURGERY | Facility: CLINIC | Age: 64
End: 2024-10-09

## 2024-10-09 NOTE — PROGRESS NOTES
Oniel reached out to HC regarding check being returned and oniel being charged 15 return fee. Charlotte shared that he deposited check via [employer] online and never had an issue before. A few days later he noticed that the check was returned. HC reached out to SHIVAM, SHIVAM wants picture of check be sent to see if there was something physically wrong with the check. Oniel will send what he has because he threw check away once he e-deposited it. HC will f/u with SHIVAM and oniel

## 2024-10-10 ENCOUNTER — PATIENT OUTREACH (OUTPATIENT)
Dept: SURGERY | Facility: CLINIC | Age: 64
End: 2024-10-10

## 2024-10-11 ENCOUNTER — PATIENT OUTREACH (OUTPATIENT)
Dept: SURGERY | Facility: CLINIC | Age: 64
End: 2024-10-11

## 2024-10-21 ENCOUNTER — PATIENT OUTREACH (OUTPATIENT)
Dept: SURGERY | Facility: CLINIC | Age: 64
End: 2024-10-21

## 2024-10-22 ENCOUNTER — PATIENT OUTREACH (OUTPATIENT)
Dept: SURGERY | Facility: CLINIC | Age: 64
End: 2024-10-22

## 2024-10-24 ENCOUNTER — PATIENT OUTREACH (OUTPATIENT)
Dept: SURGERY | Facility: CLINIC | Age: 64
End: 2024-10-24

## 2024-10-24 NOTE — PROGRESS NOTES
HC was contacted by ct in regards to returned check that the landlord was asking ct about. HC and landlord spoke over the phone. Landlord never was able to cash Octobers rent check because it was returned for some reason. HC emailed AP for clarification. They will be looking into it. [facility]

## 2024-10-24 NOTE — PROGRESS NOTES
Spoke with ct about changes to rent. Let ct know that calculations need to be completed and reviewed by HC sup.  HC spoke with ezra about returned check. HC will f/u with AP. Ct states that she needs help with her UGI bill as well. HC told Ct to send bill via picture and bill will be discussed with HC sup as well

## 2024-10-28 ENCOUNTER — PATIENT OUTREACH (OUTPATIENT)
Dept: SURGERY | Facility: CLINIC | Age: 64
End: 2024-10-28

## 2024-10-29 ENCOUNTER — PATIENT OUTREACH (OUTPATIENT)
Dept: SURGERY | Facility: CLINIC | Age: 64
End: 2024-10-29

## 2024-10-29 NOTE — PROGRESS NOTES
HC CRIS REVIEWED ONGOING TBRA CALCS WITH HC-IRIS. CT HAD A CHANGED IN HOUSEHOLD; HC COMPLETED INTERIM CALCULATIONS.

## 2024-11-01 ENCOUNTER — PATIENT OUTREACH (OUTPATIENT)
Dept: SURGERY | Facility: CLINIC | Age: 64
End: 2024-11-01

## 2024-11-06 ENCOUNTER — PATIENT OUTREACH (OUTPATIENT)
Dept: SURGERY | Facility: CLINIC | Age: 64
End: 2024-11-06

## 2024-11-08 ENCOUNTER — PATIENT OUTREACH (OUTPATIENT)
Dept: SURGERY | Facility: CLINIC | Age: 64
End: 2024-11-08

## 2024-11-10 NOTE — PROGRESS NOTES
Ct called wanting to know what her rental portion amount is. Let ct know that HC sup needs to review and should be completed at some time next week.

## 2024-11-10 NOTE — PROGRESS NOTES
Discussed efa request from ct. Ct has not paid her UGI. She is at risk for her heat being turned off. Unable to assist ct because she is already receiving ongoing rental assistance. Contacted ct and let her know to contact UGI and get on a payment plan

## 2024-11-18 ENCOUNTER — PATIENT OUTREACH (OUTPATIENT)
Dept: SURGERY | Facility: CLINIC | Age: 64
End: 2024-11-18

## 2024-12-02 NOTE — PROGRESS NOTES
Spoke with ct over the phone in regards to her rental amount and a flyer was texted to  ct about upcoming holiday party   functional limitations in following categories/impairments found

## 2024-12-16 ENCOUNTER — PATIENT OUTREACH (OUTPATIENT)
Dept: SURGERY | Facility: CLINIC | Age: 64
End: 2024-12-16

## 2024-12-24 ENCOUNTER — PATIENT OUTREACH (OUTPATIENT)
Dept: SURGERY | Facility: CLINIC | Age: 64
End: 2024-12-24

## 2025-01-10 ENCOUNTER — PATIENT OUTREACH (OUTPATIENT)
Dept: SURGERY | Facility: CLINIC | Age: 65
End: 2025-01-10

## 2025-01-13 ENCOUNTER — PATIENT OUTREACH (OUTPATIENT)
Dept: SURGERY | Facility: CLINIC | Age: 65
End: 2025-01-13

## 2025-02-13 ENCOUNTER — PATIENT OUTREACH (OUTPATIENT)
Dept: SURGERY | Facility: CLINIC | Age: 65
End: 2025-02-13

## 2025-02-20 ENCOUNTER — PATIENT OUTREACH (OUTPATIENT)
Dept: SURGERY | Facility: CLINIC | Age: 65
End: 2025-02-20

## 2025-02-28 ENCOUNTER — PATIENT OUTREACH (OUTPATIENT)
Dept: SURGERY | Facility: CLINIC | Age: 65
End: 2025-02-28

## 2025-03-14 ENCOUNTER — PATIENT OUTREACH (OUTPATIENT)
Dept: SURGERY | Facility: CLINIC | Age: 65
End: 2025-03-14

## 2025-03-17 ENCOUNTER — PATIENT OUTREACH (OUTPATIENT)
Dept: SURGERY | Facility: CLINIC | Age: 65
End: 2025-03-17

## 2025-03-19 ENCOUNTER — PATIENT OUTREACH (OUTPATIENT)
Dept: SURGERY | Facility: CLINIC | Age: 65
End: 2025-03-19

## 2025-03-24 NOTE — PROGRESS NOTES
Attempted to contact ct but was unsuccessful. Texted and left voicemail. Cm will wait for ct to return call

## 2025-03-26 ENCOUNTER — PATIENT OUTREACH (OUTPATIENT)
Dept: SURGERY | Facility: CLINIC | Age: 65
End: 2025-03-26

## 2025-03-27 NOTE — PROGRESS NOTES
Ct came in for housing renewal. Ct was diagnosed with lung cancer in December and underwent surgery to remove half of her lung. Ct states that things have been very different and a lot harder. Ct has to carry oxygen with her when she leaves and has to be on the oxygen when she is home. Ct provided most of the paperwork needed.  will inspect and collect the rest of her documents next week when she completed the inspection of the home.

## 2025-03-28 ENCOUNTER — PATIENT OUTREACH (OUTPATIENT)
Dept: SURGERY | Facility: CLINIC | Age: 65
End: 2025-03-28

## 2025-03-31 ENCOUNTER — PATIENT OUTREACH (OUTPATIENT)
Dept: SURGERY | Facility: CLINIC | Age: 65
End: 2025-03-31

## 2025-04-01 NOTE — PROGRESS NOTES
Discussed ct with HC sup. HC will complete inspection on Wednesday and continue working on pt housing renewal

## 2025-04-03 ENCOUNTER — PATIENT OUTREACH (OUTPATIENT)
Dept: SURGERY | Facility: CLINIC | Age: 65
End: 2025-04-03

## 2025-04-10 ENCOUNTER — PATIENT OUTREACH (OUTPATIENT)
Dept: SURGERY | Facility: CLINIC | Age: 65
End: 2025-04-10

## 2025-04-10 NOTE — PROGRESS NOTES
Called and spoke with ct reminding her that bank statements need to be submitted to complete housing renewal. Ct will email bank statements to HC

## 2025-04-14 ENCOUNTER — PATIENT OUTREACH (OUTPATIENT)
Dept: SURGERY | Facility: CLINIC | Age: 65
End: 2025-04-14

## 2025-04-16 ENCOUNTER — PATIENT OUTREACH (OUTPATIENT)
Dept: SURGERY | Facility: CLINIC | Age: 65
End: 2025-04-16

## 2025-04-22 NOTE — PROGRESS NOTES
Called and spoke with ct in regards to bank statement needed to complete housing renewal. Ct's girlfriend will email to HC

## 2025-05-09 ENCOUNTER — PATIENT OUTREACH (OUTPATIENT)
Dept: SURGERY | Facility: CLINIC | Age: 65
End: 2025-05-09

## 2025-05-12 NOTE — PROGRESS NOTES
Discussed ct with HC sup.Housing renewal needs to be scheduled.. HC will contact Ct. Luana req completed and sent to HC sup for approval

## 2025-05-14 ENCOUNTER — PATIENT OUTREACH (OUTPATIENT)
Dept: SURGERY | Facility: CLINIC | Age: 65
End: 2025-05-14

## 2025-05-14 NOTE — PROGRESS NOTES
HC IRIS DISCUSSED CT CASE UPDATES WITH HELLEN LYNCH. HELLEN LYNCH REVIEWED TBRA AUTH AND CALCULATIONS FOR ACCURACY AND COMPLETION.

## 2025-05-20 ENCOUNTER — PATIENT OUTREACH (OUTPATIENT)
Dept: SURGERY | Facility: CLINIC | Age: 65
End: 2025-05-20

## 2025-06-09 ENCOUNTER — PATIENT OUTREACH (OUTPATIENT)
Dept: SURGERY | Facility: CLINIC | Age: 65
End: 2025-06-09

## 2025-06-26 ENCOUNTER — PATIENT OUTREACH (OUTPATIENT)
Dept: SURGERY | Facility: CLINIC | Age: 65
End: 2025-06-26

## 2025-07-08 NOTE — PROGRESS NOTES
SPOKE WITH CT OVER THE PHONE. WANTED TO INFORM ME THAT NITESH HAS AN AVAILABLE HOUSE FOR RENT.    NITESH CALLED INFORMING ME THAT HE HAS AN AVAILABLE HOUSE FOR RENT. INFORMED NITESH THAT PROGRAM HAS LIMITED FUNDS AND IS NOT PUTTING NEW PT INTO UNITS RIGHT NOW

## 2025-07-18 ENCOUNTER — PATIENT OUTREACH (OUTPATIENT)
Dept: SURGERY | Facility: CLINIC | Age: 65
End: 2025-07-18

## 2025-08-12 ENCOUNTER — PATIENT OUTREACH (OUTPATIENT)
Dept: SURGERY | Facility: CLINIC | Age: 65
End: 2025-08-12